# Patient Record
Sex: MALE | Race: WHITE
[De-identification: names, ages, dates, MRNs, and addresses within clinical notes are randomized per-mention and may not be internally consistent; named-entity substitution may affect disease eponyms.]

---

## 2019-04-22 ENCOUNTER — HOSPITAL ENCOUNTER (OUTPATIENT)
Dept: HOSPITAL 53 - M SMT | Age: 34
End: 2019-04-22
Attending: NURSE PRACTITIONER
Payer: COMMERCIAL

## 2019-04-22 DIAGNOSIS — N20.0: Primary | ICD-10-CM

## 2020-02-03 ENCOUNTER — HOSPITAL ENCOUNTER (OUTPATIENT)
Dept: HOSPITAL 53 - M OPP | Age: 35
Discharge: HOME | End: 2020-02-03
Attending: INTERNAL MEDICINE
Payer: COMMERCIAL

## 2020-02-03 VITALS — HEIGHT: 72 IN | WEIGHT: 192.4 LBS | BODY MASS INDEX: 26.06 KG/M2

## 2020-02-03 DIAGNOSIS — R12: ICD-10-CM

## 2020-02-03 DIAGNOSIS — K22.8: Primary | ICD-10-CM

## 2020-02-03 DIAGNOSIS — Z79.899: ICD-10-CM

## 2020-02-03 DIAGNOSIS — K29.70: ICD-10-CM

## 2020-02-03 NOTE — ROOR
________________________________________________________________________________

Patient Name: Chay Cavanaugh            Procedure Date: 2/3/2020 1:55 PM

MRN: [id number]                          Account Number: [id number]

YOB: 1985               Age: 34

Room: [facility]                            Gender: Male

Note Status: Finalized                 

________________________________________________________________________________

 

Procedure:           Upper GI endoscopy

Indications:         Heartburn

Providers:           Aaron Campbell MD

Referring MD:        JAIME RUSSO MD

Requesting Provider: 

Medicines:           Monitored Anesthesia Care

Complications:       No immediate complications.

________________________________________________________________________________

Procedure:           Pre-Anesthesia Assessment:

                     - Prior to the procedure, a History and Physical was 

                     performed, and patient medications and allergies were 

                     reviewed. The patient is competent. The risks and 

                     benefits of the procedure and the sedation options and 

                     risks were discussed with the patient. All questions were 

                     answered and informed consent was obtained. Patient 

                     identification and proposed procedure were verified by 

                     the physician, the nurse and the anesthesiologist in the 

                     procedure room. Mental Status Examination: alert and 

                     oriented. Airway Examination: normal oropharyngeal airway 

                     and neck mobility. Respiratory Examination: clear to 

                     auscultation. CV Examination: normal. Prophylactic 

                     Antibiotics: The patient does not require prophylactic 

                     antibiotics. Prior Anticoagulants: The patient has taken 

                     no previous anticoagulant or antiplatelet agents. ASA 

                     Grade Assessment: II - A patient with mild systemic 

                     disease. After reviewing the risks and benefits, the 

                     patient was deemed in satisfactory condition to undergo 

                     the procedure. The anesthesia plan was to use monitored 

                     anesthesia care (MAC). Immediately prior to 

                     administration of medications, the patient was 

                     re-assessed for adequacy to receive sedatives. The heart 

                     rate, respiratory rate, oxygen saturations, blood 

                     pressure, adequacy of pulmonary ventilation, and response 

                     to care were monitored throughout the procedure. The 

                     physical status of the patient was re-assessed after the 

                     procedure.

                     The Endoscope was introduced through the mouth, and 

                     advanced to the second part of duodenum. The upper GI 

                     endoscopy was accomplished without difficulty. The 

                     patient tolerated the procedure well.

                                                                                

Findings:

     The Z-line was irregular and was found in the distal esophagus. Biopsies 

     were taken with a cold forceps for histology. Verification of patient 

     identification for the specimen was done by the physician and nurse using 

     the patient's name, birth date and medical record number. Estimated blood 

     loss was minimal.

     Scattered moderate inflammation characterized by erythema, friability and 

     granularity was found in the gastric antrum. Biopsies were taken with a 

     cold forceps for Helicobacter pylori testing.

     The duodenal bulb and second portion of the duodenum were normal.

                                                                                

Impression:          - Z-line irregular, in the distal esophagus. Biopsied.

                     - Gastritis. Biopsied.

                     - Normal duodenal bulb and second portion of the duodenum.

Recommendation:      - Patient has a contact number available for emergencies. 

                     The signs and symptoms of potential delayed complications 

                     were discussed with the patient. Return to normal 

                     activities tomorrow. Written discharge instructions were 

                     provided to the patient.

                     - Resume previous diet.

                     - Continue present medications.

                     - Follow an antireflux regimen.

                     - Await pathology results.

                     - Telephone GI clinic for pathology results in 2 weeks.

                     - Return to primary care physician.

                                                                                

 

Aaron Campbell MD

_______________________

Aaron Campbell MD

2/3/2020 2:24:46 PM

Electronically signed by Aaron Campbell MD

Number of Addenda: 0

 

Note Initiated On: 2/3/2020 1:55 PM

Estimated Blood Loss:

     Estimated blood loss was minimal.

## 2021-05-06 ENCOUNTER — HOSPITAL ENCOUNTER (OUTPATIENT)
Dept: HOSPITAL 53 - M LAB REF | Age: 36
End: 2021-05-06
Attending: PHYSICIAN ASSISTANT
Payer: COMMERCIAL

## 2021-05-06 DIAGNOSIS — D23.22: Primary | ICD-10-CM
